# Patient Record
Sex: FEMALE
[De-identification: names, ages, dates, MRNs, and addresses within clinical notes are randomized per-mention and may not be internally consistent; named-entity substitution may affect disease eponyms.]

---

## 2018-12-22 ENCOUNTER — HOSPITAL ENCOUNTER (EMERGENCY)
Dept: HOSPITAL 14 - H.ER | Age: 63
Discharge: HOME | End: 2018-12-22
Payer: MEDICARE

## 2018-12-22 VITALS — SYSTOLIC BLOOD PRESSURE: 116 MMHG | DIASTOLIC BLOOD PRESSURE: 71 MMHG | HEART RATE: 66 BPM

## 2018-12-22 VITALS — OXYGEN SATURATION: 99 % | TEMPERATURE: 97.8 F | RESPIRATION RATE: 16 BRPM

## 2018-12-22 VITALS — BODY MASS INDEX: 34 KG/M2

## 2018-12-22 DIAGNOSIS — I10: ICD-10-CM

## 2018-12-22 DIAGNOSIS — M54.9: ICD-10-CM

## 2018-12-22 DIAGNOSIS — M54.41: Primary | ICD-10-CM

## 2018-12-22 DIAGNOSIS — Z88.0: ICD-10-CM

## 2018-12-22 DIAGNOSIS — E78.00: ICD-10-CM

## 2018-12-22 DIAGNOSIS — G89.29: ICD-10-CM

## 2018-12-22 PROCEDURE — 99283 EMERGENCY DEPT VISIT LOW MDM: CPT

## 2018-12-22 PROCEDURE — 96372 THER/PROPH/DIAG INJ SC/IM: CPT

## 2018-12-22 NOTE — ED PDOC
HPI: Back


Time Seen by Provider: 18 15:29


Chief Complaint (Nursing): Back Pain


Chief Complaint (Provider): Back Pain


History Per: Patient, Family (daughter)


History/Exam Limitations: no limitations


Onset/Duration Of Symptoms: Days (x2 weeks)


Current Symptoms Are (Timing): Still Present


Additional Complaint(s): 


63 year old female presents to the ED with daughter for evaluation of lower back

pain radiating into her right lower extremity for the past two weeks ever since 

she went to rise from a seated position on the couch. Patient reports having a 

history of lumbar disc herniations with neuropathy, and takes Gabapentin daily, 

but has not taken her dose yet today. Additionally, the last pain medicine she 

reports taking was Aleve last night; no medications were taken today prior to 

arrival. Otherwise, denies recent falls, trauma, numbness, weakness, saddle 

anesthesia, fever, incontinence, abdominal pain, chest pain, nausea, vomiting, 

diarrhea, cough, and shortness of breath.





PMD: Radha Cardenas





Past Medical History


Reviewed: Historical Data, Nursing Documentation, Vital Signs


Vital Signs: 


                                Last Vital Signs











Temp  97.8 F   18 15:24


 


Pulse  80   18 15:24


 


Resp  16   18 15:24


 


BP  144/91 H  18 15:24


 


Pulse Ox  99   18 15:24














- Medical History


PMH: Arthritis, Asthma, Bronchitis, Colonic Polyps, Diverticulitis, Fractures, 

HTN, Hypercholesterolemia, Peripheral Edema, Pneumonia (,)


Other PMH: herniated discs





- Surgical History


Surgical History: Appendectomy, Endoscopy, Tonsillectomy, 





- Family History


Family History: States: Unknown Family Hx





- Living Arrangements


Living Arrangements: With Family





- Social History


Current smoker - smoking cessation education provided: No


Alcohol: None


Drugs: Denies





- Home Medications


Home Medications: 


                                Ambulatory Orders











 Medication  Instructions  Recorded


 


Losartan/Hydrochlorothiazide 1 tab PO DAILY 16





[Losartan-Hctz 100-25 mg Tab]  


 


RX: amLODIPine [Norvasc] 1 tab PO DAILY 16


 


Acetaminophen [Acetaminophen 8 650 mg PO Q8 PRN #21 tablet.er 18





Hour]  


 


Meloxicam [Mobic] 15 mg PO DAILY PRN #10 tab 18














- Allergies


Allergies/Adverse Reactions: 


                                    Allergies











Allergy/AdvReac Type Severity Reaction Status Date / Time


 


codeine Allergy Intermediate RASH Verified 18 15:23


 


Penicillins Allergy Intermediate RASH Verified 18 15:23














Review of Systems


ROS Statement: Except As Marked, All Systems Reviewed And Found Negative


Constitutional: Negative for: Fever


Cardiovascular: Negative for: Chest Pain


Respiratory: Negative for: Cough, Shortness of Breath


Gastrointestinal: Negative for: Nausea, Vomiting, Abdominal Pain, Diarrhea


Genitourinary Female: Negative for: Incontinence


Musculoskeletal: Positive for: Back Pain (lower radiating into right lower 

extremity)


Neurological: Negative for: Weakness, Numbness, Other (saddle anesthesia)





Physical Exam





- Reviewed


Nursing Documentation Reviewed: Yes


Vital Signs Reviewed: Yes





- Physical Exam


Comments: 


GENERAL APPEARANCE: Patient is awake, alert, oriented x 3; uncomfortable 

appearing.


SKIN:  Warm, dry; (-) cyanosis.


ENMT:  Mucous membranes moist. Airway patent, (-) stridor. 


NECK: Supple, FROM 


CHEST AND RESPIRATORY:  (-) rales, (-) rhonchi, (-) wheezes; breath sounds equal

 bilaterally. Respirations even and nonlabored. 


HEART AND CARDIOVASCULAR:  (-) irregularity


ABDOMEN AND GI:  Soft; (-) tenderness; (-) guarding (-) distention 


BACK:  (+) right paralumbar tenderness, (+) right sciatic notch tenderness, (-) 

deformity.  Straight leg raising (+) right side at 10 degrees.


EXTREMITIES:  (-) deformity.  Distal pulses good bilaterally.


NEURO AND PSYCH:  Mental status as above.  Intact sensation bilaterally; normal 

strength in extension of the knees, plantar and dorsiflexion of the toes. Gait: 

steady. Speech: clear. (-) facial asymmetry (-) aphasia





- ECG


O2 Sat by Pulse Oximetry: 99 (RA)


Pulse Ox Interpretation: Normal





Medical Decision Making


Medical Decision Making: 


Initial Impression:  acute on chronic back pain, sciatica  


Time: 1550


Initial Plan:


--Toradol 15mg IM


--Ultram 50mg PO ( not driving home, resides with daughter )





1705


Repeat BP: 116/71


Repeat HR: 66


On re-evaluation, patient reports improvement of symptoms. On exam, patient 

remains AAOx3, in no acute distress. Rates pain currently 5/10. Gait steady in 

ED without assistance, neuro exam with no focal deficit. Vitals stable. 


Lab/Diagnostic results d/w the patient in great detail. Diagnosis of acute on 

chronic back pain, lumbar disc herniations, sciatica d/w the patient. 


Based on history, exam and diagnostic results, plan will be for outpatient 

follow up with PMD/ortho. 


Patient instructed to follow-up with pmd / referral provided / the clinic  in 1-

2 days without fail. Advised to take medication as prescribed. Return to the 

emergency room at any time for any new or worsening symptoms. Patient states she

 fully agrees with and understands discharge instructions. States that she 

agrees with the plan and disposition. Verbalized and repeated discharge 

instructions and plan. I have given the patient opportunity to ask any 

additional questions.


 

--------------------------------------------------------------------------------


----------------- 


Scribe Attestation:


Documented by Leigha Zhu, acting as a scribe for Yani Willett PA-C.


Provider Scribe Attestation:


All medical record entries made by the Scribe were at my direction and 

personally dictated by me. I have reviewed the chart and agree that the record 

accurately reflects my personal performance of the history, physical exam, 

medical decision making, and the department course for this patient. I have also

 personally directed, reviewed, and agree with the discharge instructions and 

disposition.





Disposition





- Clinical Impression


Clinical Impression: 


 Chronic back pain, Lumbar disc herniation, Sciatica of right side








- Patient ED Disposition


Is Patient to be Admitted: No


Counseled Patient/Family Regarding: Studies Performed, Diagnosis, Need For 

Followup, Rx Given





- Disposition


Referrals: 


Mihir Nava MD [Medical Doctor] - 


Radha Cardenas MD [Family Provider] - 


Disposition: Routine/Home


Disposition Time: 17:05


Condition: STABLE


Additional Instructions: 





La atencin mdica de emergencia que recibi hoy se dirigi a dominga sntomas 

agudos. Si le recetaron algn medicamento, llnelo y tmelo segn las 

indicaciones. Los sntomas pueden tardar varios spence en resolverse. Regrese al 

Departamento de Emergencias si dominga sntomas empeoran, no mejoran o si tiene 

otros problemas.





Comunquese con story mdico dentro de 2 spence para scot nueva evaluacin y ruben un 

seguimiento o llame a deepika de los mdicos / clnicas a los que ha sido referido y

que figuran en el formulario de Informacin de visita al paciente que se incluye

en story paquete de mac. Lleve todos los documentos que le entregaron al momento 

del mac junto con todos los medicamentos que est tomando para story visita de 

seguimiento. Nuestro tratamiento no puede reemplazar la atencin mdica continua

por parte de un proveedor de atencin primaria (PCP) fuera del departamento de 

emergencias.


Prescriptions: 


Acetaminophen [Acetaminophen 8 Hour] 650 mg PO Q8 PRN #21 tablet.er


 PRN Reason: Pain, Moderate (4-7)


Meloxicam [Mobic] 15 mg PO DAILY PRN #10 tab


 PRN Reason: Pain, Moderate (4-7)


Instructions:  Sciatica, Low Back Pain in Adults, Herniated Disc, Back 

Exercises, Sciatica Exercises


Forms:  [employer] (Greenlandic)


Print Language: Bahraini





- POA


Present On Arrival: None

## 2022-06-16 PROBLEM — Z00.00 ENCOUNTER FOR PREVENTIVE HEALTH EXAMINATION: Status: ACTIVE | Noted: 2022-06-16

## 2023-06-19 ENCOUNTER — EMERGENCY (EMERGENCY)
Facility: HOSPITAL | Age: 68
LOS: 0 days | Discharge: HOME | End: 2023-06-19
Attending: EMERGENCY MEDICINE
Payer: COMMERCIAL

## 2023-06-19 VITALS
RESPIRATION RATE: 18 BRPM | TEMPERATURE: 98 F | OXYGEN SATURATION: 100 % | HEIGHT: 62 IN | HEART RATE: 76 BPM | WEIGHT: 190.92 LBS | DIASTOLIC BLOOD PRESSURE: 84 MMHG | SYSTOLIC BLOOD PRESSURE: 132 MMHG

## 2023-06-19 DIAGNOSIS — S01.01XA LACERATION WITHOUT FOREIGN BODY OF SCALP, INITIAL ENCOUNTER: ICD-10-CM
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
## 2023-06-19 DIAGNOSIS — I10 ESSENTIAL (PRIMARY) HYPERTENSION: ICD-10-CM
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
## 2023-06-19 DIAGNOSIS — E78.5 HYPERLIPIDEMIA, UNSPECIFIED: ICD-10-CM

## 2023-06-19 DIAGNOSIS — S09.90XA UNSPECIFIED INJURY OF HEAD, INITIAL ENCOUNTER: ICD-10-CM

## 2023-06-19 PROCEDURE — 99283 EMERGENCY DEPT VISIT LOW MDM: CPT | Mod: 25

## 2023-06-19 PROCEDURE — 12001 RPR S/N/AX/GEN/TRNK 2.5CM/<: CPT
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
## 2023-06-19 NOTE — ED ADULT NURSE NOTE - NSFALLUNIVINTERV_ED_ALL_ED
Bed/Stretcher in lowest position, wheels locked, appropriate side rails in place/Call bell, personal items and telephone in reach/Instruct patient to call for assistance before getting out of bed/chair/stretcher/Non-slip footwear applied when patient is off stretcher/[place] to call system/Physically safe environment - no spills, clutter or unnecessary equipment/Purposeful proactive rounding/Room/bathroom lighting operational, light cord in reach
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
## 2023-06-19 NOTE — ED PROVIDER NOTE - CARE PLAN
1 Principal Discharge DX:	Simple laceration of scalp   Principal Discharge DX:	Simple laceration of scalp  Secondary Diagnosis:	Closed head injury

## 2023-06-19 NOTE — ED PROVIDER NOTE - PHYSICAL EXAMINATION
CONSTITUTIONAL:  NAD;   SKIN:  warm, dry;   HEAD:  + 1cm simple interrupted lac to left posterior parietal scalp, no FB, no active bleeding, no underlying bony deformity; otherwise head NCAT;   EYES:  NL inspection; PERRLA, EOMI, no periorbital ecchymosis  ENT:  MMM; no nasal septal hematoma, no epistaxis, no facial TTP or hematoma  NECK: supple; normal ROM; no C/T/L spine midline TTP, stepoffs, or deformities  CARD:  RRR;   RESP:  CTAB;   ABD:  S/NT, no R/G;   MSK:  no extremity injury/deformity; full ROM x4 extremities  NEURO:  A&Ox3; CN II - XII intact; strength 5/5 and sensation grossly intact x4 extremities; gait at pt's baseline level; no somnolence  PSYCH:  cooperative, appropriate;
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
## 2023-06-19 NOTE — ED PROVIDER NOTE - CHIEF COMPLAINT
Health Maintenance Summary     Topic Due On Due Status Completed On    IMMUNIZATION - HPV  May 12, 2017 Not Due Jan 12, 2017    Immunization - Td/Tdap Oct 5, 2026 Not Due Oct 5, 2016    Immunization-Influenza  Completed Oct 5, 2016          Patient is up to date, no discussion needed . No advanced directives on file. Pt refuses 5 wishes.         The patient is a 68y Female complaining of head injury.

## 2023-06-19 NOTE — ED PROVIDER NOTE - NSFOLLOWUPINSTRUCTIONS_ED_ALL_ED_FT
Puntos, grapas o adhesivo para el cierre de la herida  Sutures, Staples, or Adhesive Wound Closure    El cierre de la herida se refiere a mantener la piel y el tejido subyacente unidos mientras nato herida cicatriza, por ejemplo, después de nato cirugía o después de nato lesión. Los médicos usan puntos (suturas), grapas, goma para cerrar la piel (adhesivo para tejidos) y tiras adhesivas para cerrar las heridas. El médico usará el método de cierre de la herida que lo ayude a sanar más rápidamente y reduzca la probabilidad de nato infección o la formación de cicatrices. El tipo de cierre depende de la ubicación, el tamaño, y la profundidad de la herida. En la misma herida puede utilizarse más de un tipo de cierre de la herida.    En la mayoría de los casos, las heridas se cierran lo antes posible (cierre cutáneo primario). A veces, el cierre se retrasa para poder limpiar la herida y dejarla sanar naturalmente stephanie semanas o meses (retraso del cierre de la herida). [place] reduce la probabilidad de infecciones.    ¿Cuáles son las diferentes clases de cierres de heridas?  Goma para cerrar la piel    Para cerrar nato herida con goma para cerrar la piel, el médico mantiene unidos los bordes de la herida y aplicará nato capa del producto en la superficie de la piel. [place]ldefonso vez se necesite más de nato capa de adhesivo. [place] vez que el adhesivo se haya secado, la herida puede cubrirse con nato venda (vendaje).    Sunil tipo de cierre cutáneo puede usarse para las heridas pequeñas que no son profundas (heridas superficiales). Suele usarse en los niños y en las heridas de la lovely. La goma para cerrar la piel es menos dolorosa que otros métodos de cierre de la herida, y no requiere el uso de un medicamento para adormecer la traci (anestesia local). Además, con sunil método, no hay nada que retirar.    La goma para cerrar la piel no puede usarse en las heridas profundas, irregulares o que sangran. No se aplica en el interior de nato herida.    Tiras adhesivas    Estas tiras están hechas de un papel que tiene pegamento (adhesivo) y tienen muchos pequeños orificios. Las tiras se colocan cruzadas sobre los bordes de la herida, sharath un vendaje normal.    Las tiras adhesivas pueden usarse para cerrar heridas pocos profundas o quirúrgicas. Pueden utilizarse junto con suturas para mejorar el cierre de la piel.    Suturas    Las suturas pueden ser de diferentes materiales, resistencias y tamaños. Pueden desintegrarse a medida que la herida cicatriza (suturas absorbibles) o puede ser necesario quitarlas (suturas no reabsorbibles).    El médico coserá con suturas y nato aguja de jude la piel o los tejidos que se encuentran debajo de la piel. Los bordes de la piel pueden cerrarse con un punto sis o con puntos separados. Luego las suturas se atarán y cortarán.    Las suturas pueden usarse para todo tipo de herida. Las suturas reabsorbibles pueden utilizarse para cerrar los tejidos subcutáneos. Las suturas pueden causar nato reacción en la piel que puede provocar nato infección.    Grapas    Para cerrar nato herida con grapas, se acercarán los bordes de la piel de ambos lados de la herida y se cerrarán. Luego se colocará nato grapa a través de la herida y se abrocharán los bordes de la grapa con un instrumento.    Las grapas suelen usarse para cerrar incisiones quirúrgicas. Se colocan con mayor rapidez que las suturas y causan menos reacciones en la piel. Deben retirarse con nato herramienta que las abre y permite quitarlas de la piel.    Siga estas instrucciones en reyes casa:  Medicamentos    Use los medicamentos de venta trinidad y los recetados solamente sharath se lo haya indicado el médico.  Si le recetaron un antibiótico, tómelo sharath se lo haya indicado el médico. No deje de sharmaine el antibiótico aunque comience a sentirse mejor.  Cuidados de la herida    Two stitched wounds. One is normal. The other is red with pus and infected.  Siga las instrucciones del médico acerca del cuidado de la herida y el vendaje.  Lávese las arlene con agua y jabón stephanie al menos 20 segundos antes y después de cambiar el vendaje. Use desinfectante para arlene si no dispone de agua y jabón.  No trate de quitarse el cierre de la herida, excepto que se lo indique el médico. Puede ser necesario que concurra a nato visita de seguimiento con el médico para que le quite el cierre de la herida.  Los cierres de la herida pueden tener que permanecer en la piel stephanie 2 semanas o más.  Las suturas reabsorbibles pueden disolverse después de algunos días o semanas.  Si los bordes de las tiras adhesivas empiezan a despegarse y enroscarse, puede recortar los que estén sueltos.  No se toque la herida. Tocarla puede causar nato infección o hacer que la herida se vuelva a abrir.  Aplíquese ungüentos o cremas sharath se lo haya indicado el médico.  Controle la herida todos los días para detectar signos de infección. Esté atento a los siguientes signos:  Enrojecimiento, hinchazón o dolor.  Líquido o anette.  Calor, erupción cutánea o dureza en el lugar de la herida de reciente aparición.  Pus o mal olor.  Instrucciones generales    No tome adin de inmersión, no nade ni use el jacuzzi hasta que el médico lo autorice. Pregúntele al médico si puede ducharse. Ildefonso vez solo le permitan darse adin de esponja.  No sumerja la herida en agua.  Siga naot dieta que incluya proteínas, vitaminas A y vitamina C que ayudarán a que la herida cicatrice.  Radha suficiente líquido sharath para mantener la orina de color amarillo pálido.  Concurra a todas las visitas de seguimiento. [place] es importante.  Comuníquese con un médico si:  Tiene fiebre o escalofríos.  Tiene enrojecimiento, hinchazón o dolor alrededor de la herida.  Observa líquido o anette que salen de la herida.  Experimenta calor, nato erupción cutánea o dureza alrededor de la herida que antes no estaban.  Observa que la herida se engrosa, se abulta y se oscurece después de retiradas las suturas (deformidad cicatricial).  Solicite ayuda de inmediato si:  PLEASE HAVE YOUR TWO (2) STAPLES REMOVED FROM YOUR HEAD IN SEVEN (7) DAYS. PLEASE CALL YOUR MRI OFFICE TO ASK IF IT IS SAFE TO HAVE AN MRI WITH STAPLES IN YOUR SCALP. THANK YOU.    --------------------    Los bordes de la herida comienzan a separarse, o la herida se vuelve a abrir.  Observa pus o percibe mal olor que provienen de la herida.    Resumen  El tipo de cierre de la herida que el médico utilice dependerá de la ubicación, el tamaño y la profundidad de la herida. Las opciones para cerrar heridas incluyen puntos (suturas), grapas, goma para cerrar la piel (adhesivo para tejidos) y tiras adhesivas.  El médico usará el método de cierre de la herida que lo ayude a sanar más rápidamente y reduzca la probabilidad de nato infección o la formación de cicatrices.  Para ayudar con la cicatrización, coma alimentos con alto contenido de proteínas, vitamina A y vitamina C.  No sumerja la herida en agua. No tome adin de inmersión, no se duche, no nade ni use el jacuzzi hasta que el médico lo autorice.  Esta información no tiene sharath fin reemplazar el consejo del médico. Asegúrese de hacerle al médico cualquier pregunta que tenga. PLEASE HAVE YOUR TWO (2) STAPLES REMOVED FROM YOUR HEAD IN SEVEN (7) DAYS. PLEASE CALL YOUR MRI OFFICE TO ASK IF IT IS SAFE TO HAVE AN MRI WITH STAPLES IN YOUR SCALP. THANK YOU.    --------------------    Puntos, grapas o adhesivo para el cierre de la herida  Sutures, Staples, or Adhesive Wound Closure    El cierre de la herida se refiere a mantener la piel y el tejido subyacente unidos mientras nato herida cicatriza, por ejemplo, después de nato cirugía o después de nato lesión. Los médicos usan puntos (suturas), grapas, goma para cerrar la piel (adhesivo para tejidos) y tiras adhesivas para cerrar las heridas. El médico usará el método de cierre de la herida que lo ayude a sanar más rápidamente y reduzca la probabilidad de nato infección o la formación de cicatrices. El tipo de cierre depende de la ubicación, el tamaño, y la profundidad de la herida. En la misma herida puede utilizarse más de un tipo de cierre de la herida.    En la mayoría de los casos, las heridas se cierran lo antes posible (cierre cutáneo primario). A veces, el cierre se retrasa para poder limpiar la herida y dejarla sanar naturalmente stephanie semanas o meses (retraso del cierre de la herida). [place] reduce la probabilidad de infecciones.    ¿Cuáles son las diferentes clases de cierres de heridas?  Goma para cerrar la piel    Para cerrar nato herida con goma para cerrar la piel, el médico mantiene unidos los bordes de la herida y aplicará nato capa del producto en la superficie de la piel. [place]ldefonso vez se necesite más de nato capa de adhesivo. [place] vez que el adhesivo se haya secado, la herida puede cubrirse con nato venda (vendaje).    Sunil tipo de cierre cutáneo puede usarse para las heridas pequeñas que no son profundas (heridas superficiales). Suele usarse en los niños y en las heridas de la lovely. La goma para cerrar la piel es menos dolorosa que otros métodos de cierre de la herida, y no requiere el uso de un medicamento para adormecer la traci (anestesia local). Además, con sunil método, no hay nada que retirar.    La goma para cerrar la piel no puede usarse en las heridas profundas, irregulares o que sangran. No se aplica en el interior de nato herida.    Tiras adhesivas    Estas tiras están hechas de un papel que tiene pegamento (adhesivo) y tienen muchos pequeños orificios. Las tiras se colocan cruzadas sobre los bordes de la herida, sharath un vendaje normal.    Las tiras adhesivas pueden usarse para cerrar heridas pocos profundas o quirúrgicas. Pueden utilizarse junto con suturas para mejorar el cierre de la piel.    Suturas    Las suturas pueden ser de diferentes materiales, resistencias y tamaños. Pueden desintegrarse a medida que la herida cicatriza (suturas absorbibles) o puede ser necesario quitarlas (suturas no reabsorbibles).    El médico coserá con suturas y nato aguja de jude la piel o los tejidos que se encuentran debajo de la piel. Los bordes de la piel pueden cerrarse con un punto sis o con puntos separados. Luego las suturas se atarán y cortarán.    Las suturas pueden usarse para todo tipo de herida. Las suturas reabsorbibles pueden utilizarse para cerrar los tejidos subcutáneos. Las suturas pueden causar nato reacción en la piel que puede provocar nato infección.    Grapas    Para cerrar nato herida con grapas, se acercarán los bordes de la piel de ambos lados de la herida y se cerrarán. Luego se colocará nato grapa a través de la herida y se abrocharán los bordes de la grapa con un instrumento.    Las grapas suelen usarse para cerrar incisiones quirúrgicas. Se colocan con mayor rapidez que las suturas y causan menos reacciones en la piel. Deben retirarse con nato herramienta que las abre y permite quitarlas de la piel.    Siga estas instrucciones en reyes casa:  Medicamentos    Use los medicamentos de venta trinidad y los recetados solamente sharath se lo haya indicado el médico.  Si le recetaron un antibiótico, tómelo sharath se lo haya indicado el médico. No deje de sharmaine el antibiótico aunque comience a sentirse mejor.  Cuidados de la herida    Two stitched wounds. One is normal. The other is red with pus and infected.  Siga las instrucciones del médico acerca del cuidado de la herida y el vendaje.  Lávese las arlene con agua y jabón stephanie al menos 20 segundos antes y después de cambiar el vendaje. Use desinfectante para arlene si no dispone de agua y jabón.  No trate de quitarse el cierre de la herida, excepto que se lo indique el médico. Puede ser necesario que concurra a nato visita de seguimiento con el médico para que le quite el cierre de la herida.  Los cierres de la herida pueden tener que permanecer en la piel stephanie 2 semanas o más.  Las suturas reabsorbibles pueden disolverse después de algunos días o semanas.  Si los bordes de las tiras adhesivas empiezan a despegarse y enroscarse, puede recortar los que estén sueltos.  No se toque la herida. Tocarla puede causar nato infección o hacer que la herida se vuelva a abrir.  Aplíquese ungüentos o cremas sharath se lo haya indicado el médico.  Controle la herida todos los días para detectar signos de infección. Esté atento a los siguientes signos:  Enrojecimiento, hinchazón o dolor.  Líquido o anette.  Calor, erupción cutánea o dureza en el lugar de la herida de reciente aparición.  Pus o mal olor.  Instrucciones generales    No tome adin de inmersión, no nade ni use el jacuzzi hasta que el médico lo autorice. Pregúntele al médico si puede ducharse. [place]ldefonso vez solo le permitan darse adin de esponja.  No sumerja la herida en agua.  Siga nato dieta que incluya proteínas, vitaminas A y vitamina C que ayudarán a que la herida cicatrice.  Radha suficiente líquido sharath para mantener la orina de color amarillo pálido.  Concurra a todas las visitas de seguimiento. [place] es importante.  Comuníquese con un médico si:  Tiene fiebre o escalofríos.  Tiene enrojecimiento, hinchazón o dolor alrededor de la herida.  Observa líquido o anette que salen de la herida.  Experimenta calor, nato erupción cutánea o dureza alrededor de la herida que antes no estaban.  Observa que la herida se engrosa, se abulta y se oscurece después de retiradas las suturas (deformidad cicatricial).  Solicite ayuda de inmediato si:  Los bordes de la herida comienzan a separarse, o la herida se vuelve a abrir.  Observa pus o percibe mal olor que provienen de la herida.    Resumen  El tipo de cierre de la herida que el médico utilice dependerá de la ubicación, el tamaño y la profundidad de la herida. Las opciones para cerrar heridas incluyen puntos (suturas), grapas, goma para cerrar la piel (adhesivo para tejidos) y tiras adhesivas.  El médico usará el método de cierre de la herida que lo ayude a sanar más rápidamente y reduzca la probabilidad de nato infección o la formación de cicatrices.  Para ayudar con la cicatrización, coma alimentos con alto contenido de proteínas, vitamina A y vitamina C.  No sumerja la herida en agua. No tome adin de inmersión, no se duche, no nade ni use el jacuzzi hasta que el médico lo autorice.  Esta información no tiene sharath fin reemplazar el consejo del médico. Asegúrese de hacerle al médico cualquier pregunta que tenga.

## 2023-06-19 NOTE — ED PROVIDER NOTE - PATIENT PORTAL LINK FT
You can access the FollowMyHealth Patient Portal offered by [facility] by registering at the following website: [email or web address]. By joining [facility]’s FollowMyHealth portal, you will also be able to view your health information using other applications (apps) compatible with our system.